# Patient Record
Sex: MALE | Race: OTHER | HISPANIC OR LATINO
[De-identification: names, ages, dates, MRNs, and addresses within clinical notes are randomized per-mention and may not be internally consistent; named-entity substitution may affect disease eponyms.]

---

## 2024-07-15 ENCOUNTER — NON-APPOINTMENT (OUTPATIENT)
Age: 34
End: 2024-07-15

## 2024-07-31 PROBLEM — Z00.00 ENCOUNTER FOR PREVENTIVE HEALTH EXAMINATION: Status: ACTIVE | Noted: 2024-07-31

## 2024-08-01 ENCOUNTER — APPOINTMENT (OUTPATIENT)
Dept: UROLOGY | Facility: CLINIC | Age: 34
End: 2024-08-01
Payer: COMMERCIAL

## 2024-08-01 VITALS
TEMPERATURE: 99.8 F | WEIGHT: 195 LBS | BODY MASS INDEX: 27.92 KG/M2 | HEART RATE: 87 BPM | HEIGHT: 70 IN | SYSTOLIC BLOOD PRESSURE: 154 MMHG | OXYGEN SATURATION: 97 % | DIASTOLIC BLOOD PRESSURE: 91 MMHG

## 2024-08-01 DIAGNOSIS — N46.9 MALE INFERTILITY, UNSPECIFIED: ICD-10-CM

## 2024-08-01 DIAGNOSIS — Z83.3 FAMILY HISTORY OF DIABETES MELLITUS: ICD-10-CM

## 2024-08-01 DIAGNOSIS — Z78.9 OTHER SPECIFIED HEALTH STATUS: ICD-10-CM

## 2024-08-01 DIAGNOSIS — R86.8 OTHER ABNORMAL FINDINGS IN SPECIMENS FROM MALE GENITAL ORGANS: ICD-10-CM

## 2024-08-01 PROCEDURE — 99204 OFFICE O/P NEW MOD 45 MIN: CPT

## 2024-08-01 NOTE — HISTORY OF PRESENT ILLNESS
[FreeTextEntry1] : NAZANIN LEUNG  is 33 year  Job: manager at [employer] Partner Name: Paula Vazquez Partner Age: 34 Prior marriage: none Prior Pregnancy none Duration of Relationship: 10 Years unprotected sexual intercourse:coitus interruptus until 6 months Time [place]: abelino x several months Sexual dysfunction: no sexual dysfunction Artificial lubricants: none Exposure history: stopped tobacco; ETOH heavy until started attempting  pregnancy Patient seen by 2 other urologists No findings referred by Dr Jose Boles treated with antbiotics

## 2024-08-01 NOTE — PHYSICAL EXAM
[Abdomen Soft] : soft [Abdomen Tenderness] : non-tender [] : no hepato-splenomegaly [Abdomen Mass (___ Cm)] : no abdominal mass palpated [Abdomen Hernia] : no hernia was discovered [Costovertebral Angle Tenderness] : no ~M costovertebral angle tenderness [Urethral Meatus] : meatus normal [Penis Abnormality] : normal circumcised penis [Epididymis] : the epididymides were normal [Testes Tenderness] : no tenderness of the testes [Testes Mass (___cm)] : there were no testicular masses [Prostate Enlargement] : the prostate was not enlarged [Prostate Tenderness] : the prostate was not tender [No Prostate Nodules] : no prostate nodules [de-identified] : NAZANIN LEUNG was offered to have a chaperone present  and declined.

## 2024-08-01 NOTE — ASSESSMENT
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  He is  for 5 years.  He has been attempting to achieve pregnancy for the last several months.  His wife is 33.  She has been treated by Dr. Jose Boles.  A semen analysis was reviewed.  Has excellent sperm count and motility.  The morphology is mildly abnormal.  There was an increased number of white blood cells noted in the semen analysis.  He was treated with doxycycline.  He had some improvement in his symptoms.  And then there has been a return.  He describes it as discomfort in the mid penile shaft.  It is not precipitated by ejaculation.  He was previously seen by 2 other urologist with no findings.  On examination he has large testicles without masses.  His phallus is normal to palpation.  Semen analysis demonstrates:  Semen analysis dated Johanny 10, 2024 volume 2.5 cc 73.5 million sperm per cc Total count 183 million Motility 41% Morphology 3.5% Increased white blood cell count  At this point it would be reasonable to proceed with repeat semen analysis which is pending.  Depending on the results of this repeat cultures should be done.  Today we will send off urine for: 1. Chlamydia/gonorrhea 2   Ureaplasma 3.  Trichomonas. 4. patient will forward results of repeat semen analysis when available

## 2024-08-01 NOTE — LETTER BODY
[Please see my note below.] : Please see my note below. [FreeTextEntry2] : Jose Boles MD [facility] [address] [place] [id number] [FreeTextEntry1] : Dear Doctor,   Thank you for your kind referral.  I am enclosing a copy of my office note for your information.   I will keep you informed of any developments.   Feel free to contact me if you have any questions.   Sincerely,   Danilo Vigil MD, FACS Professor of Urology [facility] of [place] [address]   Office Telephone [phone]   Fax [phone]

## 2024-08-03 LAB
SOURCE AMPLIFICATION: NORMAL
T VAGINALIS RRNA SPEC QL NAA+PROBE: NOT DETECTED

## 2024-08-04 LAB
C TRACH RRNA SPEC QL NAA+PROBE: NOT DETECTED
N GONORRHOEA RRNA SPEC QL NAA+PROBE: NOT DETECTED
SOURCE AMPLIFICATION: NORMAL

## 2024-08-05 ENCOUNTER — TRANSCRIPTION ENCOUNTER (OUTPATIENT)
Age: 34
End: 2024-08-05

## 2024-08-05 ENCOUNTER — NON-APPOINTMENT (OUTPATIENT)
Age: 34
End: 2024-08-05

## 2024-08-09 LAB
MYCOPLASMA HOMINIS CULTURE: NEGATIVE
UREAPLASMA CULTURE: NEGATIVE

## 2024-08-20 ENCOUNTER — APPOINTMENT (OUTPATIENT)
Dept: UROLOGY | Facility: CLINIC | Age: 34
End: 2024-08-20
Payer: COMMERCIAL

## 2024-08-20 DIAGNOSIS — R86.8 OTHER ABNORMAL FINDINGS IN SPECIMENS FROM MALE GENITAL ORGANS: ICD-10-CM

## 2024-08-20 DIAGNOSIS — Z00.00 ENCOUNTER FOR GENERAL ADULT MEDICAL EXAMINATION W/OUT ABNORMAL FINDINGS: ICD-10-CM

## 2024-08-20 PROCEDURE — 99213 OFFICE O/P EST LOW 20 MIN: CPT | Mod: 95

## 2024-08-20 PROCEDURE — 99443: CPT | Mod: 93

## 2024-08-20 NOTE — ASSESSMENT
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  He has been attempting to achieve pregnancy for the last several months.  His wife is 33.  She has been treated by Dr. Jose Boles.  A semen analysis was reviewed.  Has excellent sperm count and motility.  The morphology is mildly abnormal.  There was an increased number of white blood cells noted in the semen analysis.  He was treated with doxycycline.  He had some improvement in his symptoms.  And then there has been a return.  He describes it as discomfort in the mid penile shaft.  It is not precipitated by ejaculation.  He was previously seen by 2 other urologist with no findings.  On examination he has large testicles without masses.  His phallus is normal to palpation.  Semen analysis demonstrates:  Semen analysis dated Johanny 10, 2024 volume 2.5 cc 73.5 million sperm per cc Total count 183 million Motility 41% Morphology 3.5% Increased white blood cell count  At this point it would be reasonable to proceed with repeat semen analysis which is pending.  Depending on the results of this repeat cultures should be done.  Today we will send off urine for: 1. Chlamydia/gonorrhea 2   Ureaplasma 3.  Trichomonas. 4. patient will forward results of repeat semen analysis when available  8.20.2024 returns by telemedicine trichomonas - negative chlamydia  - normal GC - normal ureaplasma/mycoplasma - normal semen analysis  (7.15.2014) VOLUME 2 [14] tc 28 million motility 465.5% morphology 3% complaining of " vein "in left  testicular"; discomfort in left testicle wife underwent HSG; ?  tubal obstruction?  now patent discussed conservative management avoid  1 caffeine 2. ETOH 3. spicey food 4. increase water  5. fu to examine scrotal ultrasound

## 2024-08-20 NOTE — HISTORY OF PRESENT ILLNESS
[Other Location: e.g. School (Enter Location, City,State)___] : at [unfilled], at the time of the visit. 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

## 2024-08-20 NOTE — ASSESSMENT
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  He has been attempting to achieve pregnancy for the last several months.  His wife is 33.  She has been treated by Dr. Jose Boles.  A semen analysis was reviewed.  Has excellent sperm count and motility.  The morphology is mildly abnormal.  There was an increased number of white blood cells noted in the semen analysis.  He was treated with doxycycline.  He had some improvement in his symptoms.  And then there has been a return.  He describes it as discomfort in the mid penile shaft.  It is not precipitated by ejaculation.  He was previously seen by 2 other urologist with no findings.  On examination he has large testicles without masses.  His phallus is normal to palpation.  Semen analysis demonstrates:  Semen analysis dated Johanny 10, 2024 volume 2.5 cc 73.5 million sperm per cc Total count 183 million Motility 41% Morphology 3.5% Increased white blood cell count  At this point it would be reasonable to proceed with repeat semen analysis which is pending.  Depending on the results of this repeat cultures should be done.  Today we will send off urine for: 1. Chlamydia/gonorrhea 2   Ureaplasma 3.  Trichomonas. 4. patient will forward results of repeat semen analysis when available  8.20.2024 returns by telemedicine trichomonas - negative chlamydia  - normal GC - normal ureaplasma/mycoplasma - normal semen analysis  (7.15.2014) VOLUME 2 [14] tc 28 million motility 465.5% morphology 3% complaining of " vein "in left  testicular"; discomfort in left testicle wife underwent HSG; ?  tubal obstruction?  now patent discussed conservative management avoid  1 caffeine 2. ETOH 3. spicey food 4. increase water  5. fu to examine scrotal ultrasound

## 2024-08-20 NOTE — PHYSICAL EXAM
[Abdomen Soft] : soft [Abdomen Tenderness] : non-tender [] : no hepato-splenomegaly [Abdomen Mass (___ Cm)] : no abdominal mass palpated [Abdomen Hernia] : no hernia was discovered [Costovertebral Angle Tenderness] : no ~M costovertebral angle tenderness [Urethral Meatus] : meatus normal [Penis Abnormality] : normal circumcised penis [Epididymis] : the epididymides were normal [Testes Tenderness] : no tenderness of the testes [Testes Mass (___cm)] : there were no testicular masses [Prostate Enlargement] : the prostate was not enlarged [Prostate Tenderness] : the prostate was not tender [No Prostate Nodules] : no prostate nodules [de-identified] : NAZANIN LEUNG was offered to have a chaperone present  and declined.

## 2024-08-20 NOTE — LETTER BODY
[Please see my note below.] : Please see my note below. [FreeTextEntry2] : Jose Boles MD [facility] [address] [place] [id number] [FreeTextEntry1] : Dear Doctor,   Thank you for your kind referral.  I am enclosing a copy of my office note for your information.   I will keep you informed of any developments.   Feel free to contact me if you have any questions.   Sincerely,   Danilo Vigil MD, FACS Professor of Urology [facility] of [place] [address]   Office Telephone [phone]   Fax [phone]

## 2024-08-20 NOTE — PHYSICAL EXAM
[Abdomen Soft] : soft [Abdomen Tenderness] : non-tender [Abdomen Mass (___ Cm)] : no abdominal mass palpated [] : no hepato-splenomegaly [Abdomen Hernia] : no hernia was discovered [Costovertebral Angle Tenderness] : no ~M costovertebral angle tenderness [Urethral Meatus] : meatus normal [Penis Abnormality] : normal circumcised penis [Epididymis] : the epididymides were normal [Testes Tenderness] : no tenderness of the testes [Testes Mass (___cm)] : there were no testicular masses [Prostate Enlargement] : the prostate was not enlarged [Prostate Tenderness] : the prostate was not tender [No Prostate Nodules] : no prostate nodules [de-identified] : ANZANIN LEUNG was offered to have a chaperone present  and declined.

## 2024-08-20 NOTE — HISTORY OF PRESENT ILLNESS
[Other Location: e.g. School (Enter Location, City,State)___] : at [unfilled], at the time of the visit. [Medical Office: (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

## 2024-08-20 NOTE — HISTORY OF PRESENT ILLNESS
[Other Location: e.g. School (Enter Location, City,State)___] : at [unfilled], at the time of the visit. 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

## 2024-08-20 NOTE — LETTER BODY
[Please see my note below.] : Please see my note below. [FreeTextEntry2] : Jose Boles MD [facility] [address] [place] [id number] [FreeTextEntry1] : Dear Doctor,   Thank you for your kind referral.  I am enclosing a copy of my office note for your information.   I will keep you informed of any developments.   Feel free to contact me if you have any questions.   Sincerely,   Danilo Vigil MD, FACS Professor of Urology [facility] of [place] [address]   Office Telephone [phone]   Fax [phone]

## 2024-08-20 NOTE — PHYSICAL EXAM
[Abdomen Soft] : soft [Abdomen Tenderness] : non-tender [Abdomen Mass (___ Cm)] : no abdominal mass palpated [] : no hepato-splenomegaly [Abdomen Hernia] : no hernia was discovered [Costovertebral Angle Tenderness] : no ~M costovertebral angle tenderness [Urethral Meatus] : meatus normal [Penis Abnormality] : normal circumcised penis [Epididymis] : the epididymides were normal [Testes Tenderness] : no tenderness of the testes [Testes Mass (___cm)] : there were no testicular masses [Prostate Enlargement] : the prostate was not enlarged [Prostate Tenderness] : the prostate was not tender [No Prostate Nodules] : no prostate nodules [de-identified] : NAZANIN LEUNG was offered to have a chaperone present  and declined.

## 2024-08-20 NOTE — ASSESSMENT
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  He has been attempting to achieve pregnancy for the last several months.  His wife is 33.  She has been treated by Dr. Jose Boles.  A semen analysis was reviewed.  Has excellent sperm count and motility.  The morphology is mildly abnormal.  There was an increased number of white blood cells noted in the semen analysis.  He was treated with doxycycline.  He had some improvement in his symptoms.  And then there has been a return.  He describes it as discomfort in the mid penile shaft.  It is not precipitated by ejaculation.  He was previously seen by 2 other urologist with no findings.  On examination he has large testicles without masses.  His phallus is normal to palpation.  Semen analysis demonstrates:  Semen analysis dated Johanny 10, 2024 volume 2.5 cc 73.5 million sperm per cc Total count 183 million Motility 41% Morphology 3.5% Increased white blood cell count  At this point it would be reasonable to proceed with repeat semen analysis which is pending.  Depending on the results of this repeat cultures should be done.  Today we will send off urine for: 1. Chlamydia/gonorrhea 2   Ureaplasma 3.  Trichomonas. 4. patient will forward results of repeat semen analysis when available  8.20.2024 returns by telemedicine trichomonas - negative chlamydia  - normal GC - normal ureaplasma/mycoplasma - normal semen analysis  (7.15.2014) VOLUME 2 [14] tc 28 million motility 465.5% morphology 3% complaining of " vein "in left  testicular"; discomfort in left testicle wife underwent HSG; ?  tubal obstruction?  now patent discussed conservative management avoid  1 caffeine 2. ETOH 3. spicey food 4. increase water  5. fu to examine scrotal ultrasound

## 2024-08-20 NOTE — LETTER BODY
[Please see my note below.] : Please see my note below. [FreeTextEntry2] : Jose Boles MD [facility] [address] [place] [id number] [FreeTextEntry1] : Dear Doctor,   Thank you for your kind referral.  I am enclosing a copy of my office note for your information.   I will keep you informed of any developments.   Feel free to contact me if you have any questions.   Sincerely,   Danilo Vigil MD, FACS Professor of Urology [facility] of [place] [address]   Office Telephone [phone]   Fax [phone]

## 2024-08-27 ENCOUNTER — APPOINTMENT (OUTPATIENT)
Dept: UROLOGY | Facility: CLINIC | Age: 34
End: 2024-08-27
Payer: COMMERCIAL

## 2024-08-27 DIAGNOSIS — N41.0 ACUTE PROSTATITIS: ICD-10-CM

## 2024-08-27 DIAGNOSIS — N46.9 MALE INFERTILITY, UNSPECIFIED: ICD-10-CM

## 2024-08-27 DIAGNOSIS — N41.1 ACUTE PROSTATITIS: ICD-10-CM

## 2024-08-27 PROCEDURE — 99213 OFFICE O/P EST LOW 20 MIN: CPT

## 2024-08-27 PROCEDURE — 76870 US EXAM SCROTUM: CPT

## 2024-08-27 RX ORDER — SULFAMETHOXAZOLE AND TRIMETHOPRIM 800; 160 MG/1; MG/1
800-160 TABLET ORAL
Qty: 28 | Refills: 0 | Status: ACTIVE | COMMUNITY
Start: 2024-08-27 | End: 1900-01-01

## 2024-08-27 NOTE — LETTER BODY
[Please see my note below.] : Please see my note below. [FreeTextEntry2] : Jsoe Boles MD [facility] [address] [place] [id number] [FreeTextEntry1] : Dear Doctor,   Thank you for your kind referral.  I am enclosing a copy of my office note for your information.   I will keep you informed of any developments.   Feel free to contact me if you have any questions.   Sincerely,   Danilo Vigil MD, FACS Professor of Urology [facility] of [place] [address]   Office Telephone [phone]   Fax [phone]

## 2024-08-27 NOTE — PHYSICAL EXAM
[Abdomen Soft] : soft [Abdomen Tenderness] : non-tender [] : no hepato-splenomegaly [Abdomen Mass (___ Cm)] : no abdominal mass palpated [Abdomen Hernia] : no hernia was discovered [Urethral Meatus] : meatus normal [Costovertebral Angle Tenderness] : no ~M costovertebral angle tenderness [Penis Abnormality] : normal circumcised penis [Epididymis] : the epididymides were normal [Testes Tenderness] : no tenderness of the testes [Testes Mass (___cm)] : there were no testicular masses [Prostate Enlargement] : the prostate was not enlarged [Prostate Tenderness] : the prostate was not tender [No Prostate Nodules] : no prostate nodules [de-identified] : NAZANIN MADHU was offered to have a chaperone present  and declined.  PROSTATE TENDER no EPS

## 2024-08-27 NOTE — HISTORY OF PRESENT ILLNESS
[Other Location: e.g. School (Enter Location, City,State)___] : at [unfilled], at the time of the visit. 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

## 2024-08-27 NOTE — HISTORY OF PRESENT ILLNESS
[Other Location: e.g. School (Enter Location, City,State)___] : at [unfilled], at the time of the visit. [Medical Office: (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

## 2024-08-27 NOTE — LETTER BODY
[Please see my note below.] : Please see my note below. [FreeTextEntry2] : Jose Boles MD [place] [address] [id number] [FreeTextEntry1] : Dear Doctor,   Thank you for your kind referral.  I am enclosing a copy of my office note for your information.   I will keep you informed of any developments.   Feel free to contact me if you have any questions.   Sincerely,   Danilo Vigil MD, FACS Professor of Urology [facility] of [place] [address]   Office Telephone [phone]   Fax [phone]

## 2024-08-27 NOTE — HISTORY OF PRESENT ILLNESS
[Other Location: e.g. School (Enter Location, City,State)___] : at [unfilled], at the time of the visit. 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

## 2024-08-27 NOTE — PHYSICAL EXAM
[Abdomen Soft] : soft [Abdomen Tenderness] : non-tender [] : no hepato-splenomegaly [Abdomen Mass (___ Cm)] : no abdominal mass palpated [Abdomen Hernia] : no hernia was discovered [Urethral Meatus] : meatus normal [Costovertebral Angle Tenderness] : no ~M costovertebral angle tenderness [Penis Abnormality] : normal circumcised penis [Epididymis] : the epididymides were normal [Testes Tenderness] : no tenderness of the testes [Testes Mass (___cm)] : there were no testicular masses [Prostate Enlargement] : the prostate was not enlarged [No Prostate Nodules] : no prostate nodules [Prostate Tenderness] : the prostate was not tender [de-identified] : NZAANIN MADHU was offered to have a chaperone present  and declined.  PROSTATE TENDER no EPS

## 2024-08-27 NOTE — ASSESSMENT
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  He has been attempting to achieve pregnancy for the last several months.  His wife is 33.  She has been treated by Dr. Jose Boles.  A semen analysis was reviewed.  Has excellent sperm count and motility.  The morphology is mildly abnormal.  There was an increased number of white blood cells noted in the semen analysis.  He was treated with doxycycline.  He had some improvement in his symptoms.  And then there has been a return.  He describes it as discomfort in the mid penile shaft.  It is not precipitated by ejaculation.  He was previously seen by 2 other urologist with no findings.  On examination he has large testicles without masses.  His phallus is normal to palpation.  Semen analysis demonstrates:  Semen analysis dated Johanny 10, 2024 volume 2.5 cc 73.5 million sperm per cc Total count 183 million Motility 41% Morphology 3.5% Increased white blood cell count  At this point it would be reasonable to proceed with repeat semen analysis which is pending.  Depending on the results of this repeat cultures should be done.  Today we will send off urine for: 1. Chlamydia/gonorrhea 2   Ureaplasma 3.  Trichomonas. 4. patient will forward results of repeat semen analysis when available  8.20.2024 returns by telemedicine trichomonas - negative chlamydia  - normal GC - normal ureaplasma/mycoplasma - normal semen analysis  (7.15.2014) VOLUME 2 [14] tc 28 million motility 46..5% morphology 3% complaining of " vein "in left  testicular"; discomfort in left testicle wife underwent HSG; ?  tubal obstruction?  now patent discussed conservative management avoid  1 caffeine 2. ETOH 3. spicey food 4. increase water  5. fu to examine scrotal ultrasound  8.27.2024 Patient underwent scrotal ultrasonography today.  There is a scrotal man.  There is no other abnormalities.  There is no evidence of hydrocele.  There are no testicular masses.  We reviewed his repeat semen analysis.  The count is decreased compared to the prior analysis which Count.  He has not had hormone studies.  In light of his most recent semen Alysis is probably is reasonable to obtain endocrinologic studies.  He reports that his wife had partial tubal obstruction and underwent hysterosalpingogram.  Couple should return to Dr. Boles for further management in that regard.  ? urethral symptoms improved with. increased fluid intake s/p STI testing negative continues to complain of urethral pain/burning/sense of needing to urinate prostate mildy tender will treat with TMP/SX for chronic prostatitis warned re allergic reactions/sun exposure   Plan LH FSH T TMP /SX  for chronic prostatitis

## 2024-08-27 NOTE — ASSESSMENT
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  He is  for 5 years.  He has been attempting to achieve pregnancy for the last several months.  His wife is 33.  She has been treated by Dr. Jose Boles.  A semen analysis was reviewed.  Has excellent sperm count and motility.  The morphology is mildly abnormal.  There was an increased number of white blood cells noted in the semen analysis.  He was treated with doxycycline.  He had some improvement in his symptoms.  And then there has been a return.  He describes it as discomfort in the mid penile shaft.  It is not precipitated by ejaculation.  He was previously seen by 2 other urologist with no findings.  On examination he has large testicles without masses.  His phallus is normal to palpation.  Semen analysis demonstrates:  Semen analysis dated Johanny 10, 2024 volume 2.5 cc 73.5 million sperm per cc Total count 183 million Motility 41% Morphology 3.5% Increased white blood cell count  At this point it would be reasonable to proceed with repeat semen analysis which is pending.  Depending on the results of this repeat cultures should be done.  Today we will send off urine for: 1. Chlamydia/gonorrhea 2   Ureaplasma 3.  Trichomonas. 4. patient will forward results of repeat semen analysis when available  8.20.2024 returns by telemedicine trichomonas - negative chlamydia  - normal GC - normal ureaplasma/mycoplasma - normal semen analysis  (7.15.2014) VOLUME 2 [14] tc 28 million motility 46..5% morphology 3% complaining of " vein "in left  testicular"; discomfort in left testicle wife underwent HSG; ?  tubal obstruction?  now patent discussed conservative management avoid  1 caffeine 2. ETOH 3. spicey food 4. increase water  5. fu to examine scrotal ultrasound  8.27.2024 Patient underwent scrotal ultrasonography today.  There is a scrotal man.  There is no other abnormalities.  There is no evidence of hydrocele.  There are no testicular masses.  We reviewed his repeat semen analysis.  The count is decreased compared to the prior analysis which Count.  He has not had hormone studies.  In light of his most recent semen Alysis is probably is reasonable to obtain endocrinologic studies.  He reports that his wife had partial tubal obstruction and underwent hysterosalpingogram.  Couple should return to Dr. Boles for further management in that regard.  ? urethral symptoms improved with. increased fluid intake s/p STI testing negative continues to complain of urethral pain/burning/sense of needing to urinate prostate mildy tender will treat with TMP/SX for chronic prostatitis warned re allergic reactions/sun exposure   Plan LH FSH T TMP /SX  for chronic prostatitis

## 2024-08-27 NOTE — PHYSICAL EXAM
[Abdomen Soft] : soft [Abdomen Tenderness] : non-tender [] : no hepato-splenomegaly [Abdomen Mass (___ Cm)] : no abdominal mass palpated [Abdomen Hernia] : no hernia was discovered [Urethral Meatus] : meatus normal [Costovertebral Angle Tenderness] : no ~M costovertebral angle tenderness [Penis Abnormality] : normal circumcised penis [Epididymis] : the epididymides were normal [Testes Tenderness] : no tenderness of the testes [Testes Mass (___cm)] : there were no testicular masses [Prostate Enlargement] : the prostate was not enlarged [No Prostate Nodules] : no prostate nodules [Prostate Tenderness] : the prostate was not tender [de-identified] : NAZANIN MADHU was offered to have a chaperone present  and declined.  PROSTATE TENDER no EPS

## 2024-08-27 NOTE — ASSESSMENT
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  He has been attempting to achieve pregnancy for the last several months.  His wife is 33.  She has been treated by Dr. Jose Boles.  A semen analysis was reviewed.  Has excellent sperm count and motility.  The morphology is mildly abnormal.  There was an increased number of white blood cells noted in the semen analysis.  He was treated with doxycycline.  He had some improvement in his symptoms.  And then there has been a return.  He describes it as discomfort in the mid penile shaft.  It is not precipitated by ejaculation.  He was previously seen by 2 other urologist with no findings.  On examination he has large testicles without masses.  His phallus is normal to palpation.  Semen analysis demonstrates:  Semen analysis dated Johanny 10, 2024 volume 2.5 cc 73.5 million sperm per cc Total count 183 million Motility 41% Morphology 3.5% Increased white blood cell count  At this point it would be reasonable to proceed with repeat semen analysis which is pending.  Depending on the results of this repeat cultures should be done.  Today we will send off urine for: 1. Chlamydia/gonorrhea 2   Ureaplasma 3.  Trichomonas. 4. patient will forward results of repeat semen analysis when available  8.20.2024 returns by telemedicine trichomonas - negative chlamydia  - normal GC - normal ureaplasma/mycoplasma - normal semen analysis  (7.15.2014) VOLUME 2 [14] tc 28 million motility 46..5% morphology 3% complaining of " vein "in left  testicular"; discomfort in left testicle wife underwent HSG; ?  tubal obstruction?  now patent discussed conservative management avoid  1 caffeine 2. ETOH 3. spicey food 4. increase water  5. fu to examine scrotal ultrasound  8.27.2024 Patient underwent scrotal ultrasonography today.  There is a scrotal man.  There is no other abnormalities.  There is no evidence of hydrocele.  There are no testicular masses.  We reviewed his repeat semen analysis.  The count is decreased compared to the prior analysis which Count.  He has not had hormone studies.  In light of his most recent semen Alysis is probably is reasonable to obtain endocrinologic studies.  He reports that his wife had partial tubal obstruction and underwent hysterosalpingogram.  Couple should return to Dr. Boles for further management in that regard.  ? urethral symptoms improved with. increased fluid intake s/p STI testing negative continues to complain of urethral pain/burning/sense of needing to urinate prostate mildy tender will treat with TMP/SX for chronic prostatitis warned re allergic reactions/sun exposure   Plan LH FSH T TMP /SX  for chronic prostatitis

## 2024-08-27 NOTE — LETTER BODY
[Please see my note below.] : Please see my note below. [FreeTextEntry2] : Jose Boles MD [facility] [id number] [place] [id number] [FreeTextEntry1] : Dear Doctor,   Thank you for your kind referral.  I am enclosing a copy of my office note for your information.   I will keep you informed of any developments.   Feel free to contact me if you have any questions.   Sincerely,   Danilo Vigil MD, FACS Professor of Urology [facility] [place] [id number]   Office Telephone [phone]   Fax [phone]

## 2024-09-30 ENCOUNTER — APPOINTMENT (OUTPATIENT)
Dept: UROLOGY | Facility: CLINIC | Age: 34
End: 2024-09-30
Payer: COMMERCIAL

## 2024-09-30 VITALS
HEART RATE: 96 BPM | SYSTOLIC BLOOD PRESSURE: 130 MMHG | TEMPERATURE: 98.42 F | DIASTOLIC BLOOD PRESSURE: 85 MMHG | OXYGEN SATURATION: 97 %

## 2024-09-30 DIAGNOSIS — N46.9 MALE INFERTILITY, UNSPECIFIED: ICD-10-CM

## 2024-09-30 DIAGNOSIS — N41.0 ACUTE PROSTATITIS: ICD-10-CM

## 2024-09-30 DIAGNOSIS — R30.0 DYSURIA: ICD-10-CM

## 2024-09-30 DIAGNOSIS — N41.1 ACUTE PROSTATITIS: ICD-10-CM

## 2024-09-30 PROCEDURE — G2211 COMPLEX E/M VISIT ADD ON: CPT | Mod: NC

## 2024-09-30 PROCEDURE — 99213 OFFICE O/P EST LOW 20 MIN: CPT

## 2024-09-30 NOTE — ASSESSMENT
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  He has been attempting to achieve pregnancy for the last several months.  His wife is 33.  She has been treated by Dr. Jose Boles.  A semen analysis was reviewed.  Has excellent sperm count and motility.  The morphology is mildly abnormal.  There was an increased number of white blood cells noted in the semen analysis.  He was treated with doxycycline.  He had some improvement in his symptoms.  And then there has been a return.  He describes it as discomfort in the mid penile shaft.  It is not precipitated by ejaculation.  He was previously seen by 2 other urologist with no findings.  On examination he has large testicles without masses.  His phallus is normal to palpation.  Semen analysis demonstrates:  Semen analysis dated Johanny 10, 2024 volume 2.5 cc 73.5 million sperm per cc Total count 183 million Motility 41% Morphology 3.5% Increased white blood cell count  At this point it would be reasonable to proceed with repeat semen analysis which is pending.  Depending on the results of this repeat cultures should be done.  Today we will send off urine for: 1. Chlamydia/gonorrhea 2   Ureaplasma 3.  Trichomonas. 4. patient will forward results of repeat semen analysis when available  8.20.2024 returns by telemedicine trichomonas - negative chlamydia  - normal GC - normal ureaplasma/mycoplasma - normal semen analysis  (7.15.2014) VOLUME 2 [14] tc 28 million motility 46..5% morphology 3% complaining of " vein "in left  testicular"; discomfort in left testicle wife underwent HSG; ?  tubal obstruction?  now patent discussed conservative management avoid  1 caffeine 2. ETOH 3. spicey food 4. increase water  5. fu to examine scrotal ultrasound  8.27.2024 Patient underwent scrotal ultrasonography today.  There is a scrotal man.  There is no other abnormalities.  There is no evidence of hydrocele.  There are no testicular masses.   We reviewed his repeat semen analysis.  The count is decreased compared to the prior analysis which Count.  He has not had hormone studies.  In light of his most recent semen Alysis is probably is reasonable to obtain endocrinologic studies.  He reports that his wife had partial tubal obstruction and underwent hysterosalpingogram.  Couple should return to Dr. Boles for further management in that regard.  ? urethral symptoms improved with. increased fluid intake s/p STI testing negative continues to complain of urethral pain/burning/sense of needing to urinate prostate mildy tender will treat with TMP/SX for chronic prostatitis warned re allergic reactions/sun exposure   Plan LH FSH T TMP /SX  for chronic prostatitis   9.30.2024 Patient returns after treatment with trimethoprim/sulfamethoxazole.  He has had no improvement with his urinary symptoms.  He is status post cystoscopy by Dr. Hakan Morales.  This was normal.  His STI testing was normal.  We discussed proceeding with semen cultures at this time.  We also reviewed his testosterone level which was low.  We plan to repeat this.  His FSH was also noted to be no.  We discussed the potential significance of this.  Plan: 1.  Semen cultures/semen analysis 2.  Testosterone 3.  Prolactin 4.  LH 5.  FSH 6. +/- pelvic

## 2024-09-30 NOTE — HISTORY OF PRESENT ILLNESS
[FreeTextEntry1] : NAZANIN LEUNG  is 33 year  Job: manager at [employer] Partner Name: Paula Vazquez Partner Age: 32 Prior marriage: none Prior Pregnancy none Duration of Relationship: 10 Years unprotected sexual intercourse:coitus interruptus until 6 months Time [place]: abelino x several months Sexual dysfunction: no sexual dysfunction Artificial lubricants: none Exposure history: stopped tobacco; ETOH heavy until started attempting  pregnancy Patient seen by 2 other urologists No findings referred by Dr Jose Boles treated with antbiotics   8.20.2024 telemedicine visit [facility]  8.27.2024 returns for USG and management  9.30.2024 continues to complain of penile  complaining of decreased stream Almaraz MD did cystoscopy no improvement in pain/urethral   infertiliy  low FSH low T  Plan semen cultures repeat T low FSG free T SHBG

## 2024-10-01 LAB
FSH SERPL-MCNC: 1 IU/L
LH SERPL-ACNC: 2.9 IU/L
PROLACTIN SERPL-MCNC: 15 NG/ML
SHBG SERPL-SCNC: 13 NMOL/L

## 2024-10-03 ENCOUNTER — TRANSCRIPTION ENCOUNTER (OUTPATIENT)
Age: 34
End: 2024-10-03

## 2024-10-03 DIAGNOSIS — R79.89 OTHER SPECIFIED ABNORMAL FINDINGS OF BLOOD CHEMISTRY: ICD-10-CM

## 2024-10-03 LAB
TESTOST FREE SERPL-MCNC: 5.4 PG/ML
TESTOST SERPL-MCNC: 186 NG/DL

## 2024-10-03 RX ORDER — CLOMIPHENE CITRATE 50 MG/1
50 TABLET ORAL 3 TIMES DAILY
Qty: 21 | Refills: 0 | Status: ACTIVE | COMMUNITY
Start: 2024-10-03 | End: 1900-01-01

## 2024-10-04 ENCOUNTER — TRANSCRIPTION ENCOUNTER (OUTPATIENT)
Age: 34
End: 2024-10-04

## 2024-10-14 ENCOUNTER — TRANSCRIPTION ENCOUNTER (OUTPATIENT)
Age: 34
End: 2024-10-14

## 2024-10-15 ENCOUNTER — TRANSCRIPTION ENCOUNTER (OUTPATIENT)
Age: 34
End: 2024-10-15

## 2024-10-22 ENCOUNTER — APPOINTMENT (OUTPATIENT)
Dept: UROLOGY | Facility: CLINIC | Age: 34
End: 2024-10-22
Payer: COMMERCIAL

## 2024-10-22 DIAGNOSIS — R86.8 OTHER ABNORMAL FINDINGS IN SPECIMENS FROM MALE GENITAL ORGANS: ICD-10-CM

## 2024-10-22 DIAGNOSIS — R79.89 OTHER SPECIFIED ABNORMAL FINDINGS OF BLOOD CHEMISTRY: ICD-10-CM

## 2024-10-22 DIAGNOSIS — N46.9 MALE INFERTILITY, UNSPECIFIED: ICD-10-CM

## 2024-10-22 PROCEDURE — 99214 OFFICE O/P EST MOD 30 MIN: CPT

## 2024-10-23 LAB
ESTRADIOL SERPL-MCNC: 6 PG/ML
FSH SERPL-MCNC: 1.1 IU/L
LH SERPL-ACNC: 3.5 IU/L
TESTOST SERPL-MCNC: 175 NG/DL
TSH SERPL-ACNC: 0.9 UIU/ML

## 2024-10-25 ENCOUNTER — TRANSCRIPTION ENCOUNTER (OUTPATIENT)
Age: 34
End: 2024-10-25

## 2024-10-25 RX ORDER — CLOMIPHENE CITRATE 50 MG/1
50 TABLET ORAL 3 TIMES DAILY
Qty: 21 | Refills: 0 | Status: ACTIVE | COMMUNITY
Start: 2024-10-15

## 2024-10-25 RX ORDER — AMPICILLIN 500 MG/1
500 CAPSULE ORAL 4 TIMES DAILY
Qty: 40 | Refills: 0 | Status: ACTIVE | COMMUNITY
Start: 2024-10-22 | End: 1900-01-01

## 2024-11-15 ENCOUNTER — NON-APPOINTMENT (OUTPATIENT)
Age: 34
End: 2024-11-15

## 2024-11-26 ENCOUNTER — APPOINTMENT (OUTPATIENT)
Dept: MRI IMAGING | Facility: CLINIC | Age: 34
End: 2024-11-26
Payer: COMMERCIAL

## 2024-11-26 PROCEDURE — 70553 MRI BRAIN STEM W/O & W/DYE: CPT

## 2024-11-26 PROCEDURE — A9585: CPT | Mod: JW

## 2024-12-02 ENCOUNTER — TRANSCRIPTION ENCOUNTER (OUTPATIENT)
Age: 34
End: 2024-12-02

## 2024-12-06 ENCOUNTER — TRANSCRIPTION ENCOUNTER (OUTPATIENT)
Age: 34
End: 2024-12-06

## 2024-12-09 ENCOUNTER — TRANSCRIPTION ENCOUNTER (OUTPATIENT)
Age: 34
End: 2024-12-09

## 2024-12-10 ENCOUNTER — TRANSCRIPTION ENCOUNTER (OUTPATIENT)
Age: 34
End: 2024-12-10

## 2024-12-30 ENCOUNTER — TRANSCRIPTION ENCOUNTER (OUTPATIENT)
Age: 34
End: 2024-12-30

## 2024-12-31 ENCOUNTER — NON-APPOINTMENT (OUTPATIENT)
Age: 34
End: 2024-12-31

## 2024-12-31 ENCOUNTER — TRANSCRIPTION ENCOUNTER (OUTPATIENT)
Age: 34
End: 2024-12-31

## 2025-02-13 ENCOUNTER — APPOINTMENT (OUTPATIENT)
Dept: UROLOGY | Facility: CLINIC | Age: 35
End: 2025-02-13
Payer: COMMERCIAL

## 2025-02-13 VITALS
DIASTOLIC BLOOD PRESSURE: 93 MMHG | SYSTOLIC BLOOD PRESSURE: 141 MMHG | HEART RATE: 90 BPM | TEMPERATURE: 207.32 F | OXYGEN SATURATION: 96 %

## 2025-02-13 PROCEDURE — 51741 ELECTRO-UROFLOWMETRY FIRST: CPT

## 2025-02-13 PROCEDURE — 99214 OFFICE O/P EST MOD 30 MIN: CPT

## 2025-02-13 PROCEDURE — 51798 US URINE CAPACITY MEASURE: CPT

## 2025-02-14 LAB
APPEARANCE: CLEAR
BACTERIA: NEGATIVE /HPF
BILIRUBIN URINE: NEGATIVE
BLOOD URINE: NEGATIVE
CAST: 0 /LPF
COLOR: YELLOW
EPITHELIAL CELLS: 0 /HPF
GLUCOSE QUALITATIVE U: NEGATIVE MG/DL
KETONES URINE: NEGATIVE MG/DL
LEUKOCYTE ESTERASE URINE: NEGATIVE
MICROSCOPIC-UA: NORMAL
NITRITE URINE: NEGATIVE
PH URINE: 6
PROTEIN URINE: NEGATIVE MG/DL
RED BLOOD CELLS URINE: 0 /HPF
SPECIFIC GRAVITY URINE: 1.01
UROBILINOGEN URINE: 0.2 MG/DL
WHITE BLOOD CELLS URINE: 0 /HPF

## 2025-02-17 LAB — BACTERIA UR CULT: NORMAL

## 2025-02-18 ENCOUNTER — TRANSCRIPTION ENCOUNTER (OUTPATIENT)
Age: 35
End: 2025-02-18

## 2025-03-04 ENCOUNTER — APPOINTMENT (OUTPATIENT)
Dept: UROLOGY | Facility: CLINIC | Age: 35
End: 2025-03-04

## 2025-03-06 ENCOUNTER — APPOINTMENT (OUTPATIENT)
Dept: UROLOGY | Facility: CLINIC | Age: 35
End: 2025-03-06
Payer: COMMERCIAL

## 2025-03-06 PROCEDURE — 52000 CYSTOURETHROSCOPY: CPT

## 2025-03-06 RX ORDER — ALFUZOSIN HYDROCHLORIDE 10 MG/1
10 TABLET, EXTENDED RELEASE ORAL
Qty: 30 | Refills: 0 | Status: ACTIVE | COMMUNITY
Start: 2025-03-06 | End: 1900-01-01

## 2025-04-09 ENCOUNTER — NON-APPOINTMENT (OUTPATIENT)
Age: 35
End: 2025-04-09

## 2025-04-09 ENCOUNTER — APPOINTMENT (OUTPATIENT)
Dept: ENDOCRINOLOGY | Facility: CLINIC | Age: 35
End: 2025-04-09
Payer: COMMERCIAL

## 2025-04-09 VITALS
BODY MASS INDEX: 24.91 KG/M2 | HEART RATE: 79 BPM | SYSTOLIC BLOOD PRESSURE: 134 MMHG | DIASTOLIC BLOOD PRESSURE: 82 MMHG | WEIGHT: 174 LBS | HEIGHT: 70 IN

## 2025-04-09 DIAGNOSIS — N46.9 MALE INFERTILITY, UNSPECIFIED: ICD-10-CM

## 2025-04-09 PROBLEM — D35.2 PITUITARY MICROADENOMA: Status: ACTIVE | Noted: 2025-04-09

## 2025-04-09 PROCEDURE — 99204 OFFICE O/P NEW MOD 45 MIN: CPT

## 2025-04-10 ENCOUNTER — APPOINTMENT (OUTPATIENT)
Dept: UROLOGY | Facility: CLINIC | Age: 35
End: 2025-04-10
Payer: COMMERCIAL

## 2025-04-10 DIAGNOSIS — D35.2 BENIGN NEOPLASM OF PITUITARY GLAND: ICD-10-CM

## 2025-04-10 DIAGNOSIS — R30.0 DYSURIA: ICD-10-CM

## 2025-04-10 DIAGNOSIS — R79.89 OTHER SPECIFIED ABNORMAL FINDINGS OF BLOOD CHEMISTRY: ICD-10-CM

## 2025-04-10 LAB
ALBUMIN SERPL ELPH-MCNC: 4.8 G/DL
ALP BLD-CCNC: 117 U/L
ALT SERPL-CCNC: 24 U/L
ANION GAP SERPL CALC-SCNC: 14 MMOL/L
AST SERPL-CCNC: 22 U/L
BASOPHILS # BLD AUTO: 0.02 K/UL
BASOPHILS NFR BLD AUTO: 0.3 %
BILIRUB SERPL-MCNC: 0.4 MG/DL
BUN SERPL-MCNC: 14 MG/DL
CALCIUM SERPL-MCNC: 9.6 MG/DL
CHLORIDE SERPL-SCNC: 104 MMOL/L
CO2 SERPL-SCNC: 23 MMOL/L
CREAT SERPL-MCNC: 0.92 MG/DL
EGFRCR SERPLBLD CKD-EPI 2021: 113 ML/MIN/1.73M2
EOSINOPHIL # BLD AUTO: 0.06 K/UL
EOSINOPHIL NFR BLD AUTO: 0.9 %
ESTRADIOL SERPL-MCNC: 22 PG/ML
FSH SERPL-MCNC: 1.3 IU/L
GH SERPL-MCNC: 0.36 NG/ML
GLUCOSE SERPL-MCNC: 93 MG/DL
HCT VFR BLD CALC: 42.8 %
HGB BLD-MCNC: 14.1 G/DL
IMM GRANULOCYTES NFR BLD AUTO: 0.3 %
LH SERPL-ACNC: 3.6 IU/L
LYMPHOCYTES # BLD AUTO: 1.33 K/UL
LYMPHOCYTES NFR BLD AUTO: 19.7 %
MAN DIFF?: NORMAL
MCHC RBC-ENTMCNC: 28.1 PG
MCHC RBC-ENTMCNC: 32.9 G/DL
MCV RBC AUTO: 85.4 FL
MONOCYTES # BLD AUTO: 0.37 K/UL
MONOCYTES NFR BLD AUTO: 5.5 %
NEUTROPHILS # BLD AUTO: 4.95 K/UL
NEUTROPHILS NFR BLD AUTO: 73.3 %
PLATELET # BLD AUTO: 264 K/UL
POTASSIUM SERPL-SCNC: 4.1 MMOL/L
PROLACTIN SERPL-MCNC: 9.5 NG/ML
PROT SERPL-MCNC: 7.4 G/DL
RBC # BLD: 5.01 M/UL
RBC # FLD: 13.3 %
SODIUM SERPL-SCNC: 141 MMOL/L
T3 SERPL-MCNC: 142 NG/DL
T4 FREE SERPL-MCNC: 1.1 NG/DL
TESTOST FREE SERPL-MCNC: 10.5 PG/ML
TESTOST SERPL-MCNC: 412 NG/DL
TSH SERPL-ACNC: 1.37 UIU/ML
WBC # FLD AUTO: 6.75 K/UL

## 2025-04-10 PROCEDURE — 51798 US URINE CAPACITY MEASURE: CPT

## 2025-04-10 PROCEDURE — 99214 OFFICE O/P EST MOD 30 MIN: CPT | Mod: 25

## 2025-04-10 PROCEDURE — 51741 ELECTRO-UROFLOWMETRY FIRST: CPT

## 2025-04-16 LAB — ACTH-ESO: 11 PG/ML

## 2025-04-18 LAB
CORTICOSTEROID BIND GLOBULIN: 3.4 MG/DL
CORTIS SERPL-MCNC: 10 UG/DL
CORTISOL, FREE: 0.26 UG/DL
IGF-1 (BL): 68 NG/ML
PFCX: 2.6 %

## 2025-04-22 ENCOUNTER — NON-APPOINTMENT (OUTPATIENT)
Age: 35
End: 2025-04-22

## 2025-04-22 RX ORDER — DEXAMETHASONE 1 MG/1
1 TABLET ORAL
Qty: 1 | Refills: 0 | Status: ACTIVE | COMMUNITY
Start: 2025-04-22 | End: 1900-01-01

## 2025-04-23 ENCOUNTER — TRANSCRIPTION ENCOUNTER (OUTPATIENT)
Age: 35
End: 2025-04-23

## 2025-04-24 ENCOUNTER — TRANSCRIPTION ENCOUNTER (OUTPATIENT)
Age: 35
End: 2025-04-24

## 2025-04-24 RX ORDER — COSYNTROPIN 0.25 MG/ML
0.25 INJECTION, POWDER, LYOPHILIZED, FOR SOLUTION INTRAVENOUS
Qty: 1 | Refills: 0 | Status: ACTIVE | OUTPATIENT
Start: 2025-04-24

## 2025-04-25 ENCOUNTER — APPOINTMENT (OUTPATIENT)
Dept: ENDOCRINOLOGY | Facility: CLINIC | Age: 35
End: 2025-04-25

## 2025-04-25 PROCEDURE — 36415 COLL VENOUS BLD VENIPUNCTURE: CPT

## 2025-04-28 LAB
ACTH STIM ACTH BASELINE: 20.2 PG/ML
ACTH STIM CORTISOL 30 MIN: 20.8 UG/DL
ACTH STIM CORTISOL BASELINE: 10.5 UG/DL
ACTH STIMULATION : CORTISOL 60 MIN: 24.1 UG/DL

## 2025-05-14 ENCOUNTER — NON-APPOINTMENT (OUTPATIENT)
Age: 35
End: 2025-05-14

## 2025-05-14 ENCOUNTER — TRANSCRIPTION ENCOUNTER (OUTPATIENT)
Age: 35
End: 2025-05-14